# Patient Record
Sex: MALE | Race: WHITE | NOT HISPANIC OR LATINO | Employment: STUDENT | URBAN - METROPOLITAN AREA
[De-identification: names, ages, dates, MRNs, and addresses within clinical notes are randomized per-mention and may not be internally consistent; named-entity substitution may affect disease eponyms.]

---
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
## 2025-01-14 ENCOUNTER — OFFICE VISIT (OUTPATIENT)
Dept: DERMATOLOGY | Facility: CLINIC | Age: 20
End: 2025-01-14
Payer: COMMERCIAL

## 2025-01-14 ENCOUNTER — TELEPHONE (OUTPATIENT)
Dept: DERMATOLOGY | Facility: CLINIC | Age: 20
End: 2025-01-14

## 2025-01-14 VITALS — SYSTOLIC BLOOD PRESSURE: 135 MMHG | HEART RATE: 69 BPM | DIASTOLIC BLOOD PRESSURE: 84 MMHG

## 2025-01-14 DIAGNOSIS — D21.9: Primary | ICD-10-CM

## 2025-01-14 PROCEDURE — 11442 EXC FACE-MM B9+MARG 1.1-2 CM: CPT | Mod: 59 | Performed by: DERMATOLOGY

## 2025-01-14 PROCEDURE — 14060 TIS TRNFR E/N/E/L 10 SQ CM/<: CPT | Mod: GC | Performed by: DERMATOLOGY

## 2025-01-14 NOTE — PROGRESS NOTES
[facility] Excision with Frozen Pathology Surgery Procedure Note    Dermatology Problem List:  1. Epithelioid Fibrous histiocytoma, R nasal side wall, s/p excision with frozen specimen path and advancement flap repair 1/14/24     ________________________________________________________________________      Case #: 1  Date of Service:  Jan 14, 2025  Surgery: Excision with Frozen Pathology review and Advancement flap repair  Staff surgeon: Dimitris Gordillo DO  Fellow surgeon: Bruno Villaseñor MD      Tumor Type: Fibrous histiocytoma  Location: Right nasal sidewall  Derm-Path Accession #: [id number]     Derm Surgery Accession #: [id number]  Pre-Op Size: 0.8 cm x 0.7 cm  Final Defect Size: 1.5 cm x 1.4 cm  Number of Mohs stages: 1  Level of Defect: Muscle  Local anesthetic: 7 mL 1% lidocaine with epinephrine  Repair Type: Advancement flap  Repair Size: 4.5 x 1.2 cm  Suture Material: 5-0 Vicryl Rapide; 4-0 Monocryl    Procedure:    Stage I  We discussed the principles of treatment and most likely complications including scarring, bleeding, infection, swelling, pain, crusting, nerve damage, large wound,  incomplete excision, wound dehiscence,  nerve damage, recurrence, and a second procedure may be recommended to obtain the best cosmetic or functional result.    Informed consent was obtained and the patient underwent the procedure as follows:  The patient was placed supine on the operating table.  The cancer was identified, outlined with a marker, and verified by the patient.  The entire surgical field was prepped with chlorhexidine.  The surgical site was anesthetized using lidocaine with epinephrine.    The area of clinically apparent tumor was sharply debulked and submitted for frozen specimen bread loafing for histologic confirmation of the tumor appearance. The excision margin layer of tissue was then surgically excised using a #15 blade and was then transferred onto a specimen sheet maintaining the  orientation of the specimen. Hemostasis was obtained using electrocoagulation. The wound site was then covered with a dressing while the tissue samples were processed for examination.    The excised tissue was transported to the histology laboratory maintaining the tissue orientation.  The tissue specimen was relaxed so that the entire surgical margin was in a single horizontal plane for sectioning and inked for precise mapping.  A precise reference map was drawn to reflect the sectioning of the specimen, colored inking of the margins, and orientation on the patient.  The tissue was processed using horizontal sectioning of the base and continuous peripheral margins.  The histopathologic sections were reviewed in conjunction with the reference map.    Total blocks: 2  Total slides: 4    There were no cancer cells visualized at the excision margin on examination, therefore excision surgery was complete.    Reconstruction: Advancement Flap    PROCEDURE:  The wound was debeveled and undermined broadly in all directions to the level of fat. Hemostasis was obtained using electrocoagulation. The advancement flap was incised to the level of fat removing a cone of redundant tissue from nasal bridge. The flap was further undermined in all directions.    Hemostasis was again obtained as above.  The flap was then advanced into (carried over) the defect and secured using buried dermal sutures.  Redundant areas of tissue were excised using the triangulation technique.  The flap wound edges of both the primary and secondary defects were then approximated with buried dermal sutures, and the epidermis was then carefully approximated using 4-0 Monocryl sutures.  The wound was cleansed with sterile saline, and ointment was applied along the wound surface.  A sterile pressure dressing of non-adherent gauze was applied and wound care instructions were given verbally and in writing. The patient left the operating suite in stable  condition. Derma-Abrasion can be used as a second stage of this reconstruction to enhance cosmesis.    Dimitris Gordillo was always immediately available.     Dr Bruno Villaseñor (Mohs micrographic surgery fellow) performed the micrographic surgery; and Dimitris Gordillo DO performed the reconstruction/repair and was present for the entire micrographic surgery and always immediately available.     Staff Physician Comments:   I saw and evaluated the patient with the Mohs Surgery Fellow (Dr. Bruno Villaseñor) and I agree with the assessment and plan and the above description of the procedure. I personally performed the entire procedure and entire exam.    Dimitris Gordillo DO    Department of Dermatology  [facility]: Phone: [phone], Fax:[phone]  [facility]: Phone: [phone], Fax: [phone]    Care and Laboratory Testing Performed at:  [facility]   Clinics and Surgery Center - Dermatologic Surgery Clinic  [address] [place] [address]   Mail Code [id number] [address]

## 2025-01-14 NOTE — LETTER
1/14/2025       RE: Evelio Arciniega  [address]  [place] [address]     Dear Colleague,    Thank you for referring your patient, Evelio Arciniega, to the [facility]MATOLOGIC SURGERY [facility] [place] [facility]. Please see a copy of my visit note below.    [facility] Excision with Frozen Pathology Surgery Procedure Note    Dermatology Problem List:  1. Epithelioid Fibrous histiocytoma, R nasal side wall, s/p excision with frozen specimen path and advancement flap repair 1/14/24     ________________________________________________________________________      Case #: 1  Date of Service:  Jan 14, 2025  Surgery: Excision with Frozen Pathology review and Advancement flap repair  Staff surgeon: Dimitris Gordillo DO  Fellow surgeon: Bruno Villaseñor MD      Tumor Type: Fibrous histiocytoma  Location: Right nasal sidewall  Derm-Path Accession #: [id number]     Derm Surgery Accession #: [id number]  Pre-Op Size: 0.8 cm x 0.7 cm  Final Defect Size: 1.5 cm x 1.4 cm  Number of Mohs stages: 1  Level of Defect: Muscle  Local anesthetic: 7 mL 1% lidocaine with epinephrine  Repair Type: Advancement flap  Repair Size: 4.5 x 1.2 cm  Suture Material: 5-0 Vicryl Rapide; 4-0 Monocryl    Procedure:    Stage I  We discussed the principles of treatment and most likely complications including scarring, bleeding, infection, swelling, pain, crusting, nerve damage, large wound,  incomplete excision, wound dehiscence,  nerve damage, recurrence, and a second procedure may be recommended to obtain the best cosmetic or functional result.    Informed consent was obtained and the patient underwent the procedure as follows:  The patient was placed supine on the operating table.  The cancer was identified, outlined with a marker, and verified by the patient.  The entire surgical field was prepped with chlorhexidine.  The surgical site was anesthetized  using lidocaine with epinephrine.    The area of clinically apparent tumor was sharply debulked and submitted for frozen specimen bread loafing for histologic confirmation of the tumor appearance. The excision margin layer of tissue was then surgically excised using a #15 blade and was then transferred onto a specimen sheet maintaining the orientation of the specimen. Hemostasis was obtained using electrocoagulation. The wound site was then covered with a dressing while the tissue samples were processed for examination.    The excised tissue was transported to the histology laboratory maintaining the tissue orientation.  The tissue specimen was relaxed so that the entire surgical margin was in a single horizontal plane for sectioning and inked for precise mapping.  A precise reference map was drawn to reflect the sectioning of the specimen, colored inking of the margins, and orientation on the patient.  The tissue was processed using horizontal sectioning of the base and continuous peripheral margins.  The histopathologic sections were reviewed in conjunction with the reference map.    Total blocks: 2  Total slides: 4    There were no cancer cells visualized at the excision margin on examination, therefore excision surgery was complete.    Reconstruction: Advancement Flap    PROCEDURE:  The wound was debeveled and undermined broadly in all directions to the level of fat. Hemostasis was obtained using electrocoagulation. The advancement flap was incised to the level of fat removing a cone of redundant tissue from nasal bridge. The flap was further undermined in all directions.    Hemostasis was again obtained as above.  The flap was then advanced into (carried over) the defect and secured using buried dermal sutures.  Redundant areas of tissue were excised using the triangulation technique.  The flap wound edges of both the primary and secondary defects were then approximated with buried dermal sutures, and the  epidermis was then carefully approximated using 4-0 Monocryl sutures.  The wound was cleansed with sterile saline, and ointment was applied along the wound surface.  A sterile pressure dressing of non-adherent gauze was applied and wound care instructions were given verbally and in writing. The patient left the operating suite in stable condition. Derma-Abrasion can be used as a second stage of this reconstruction to enhance cosmesis.    Dimitris Gordillo was always immediately available.     Dr Bruno Villaseñor (Mohs micrographic surgery fellow) performed the micrographic surgery; and Dimitris Gordillo DO performed the reconstruction/repair and was present for the entire micrographic surgery and always immediately available.     Staff Physician Comments:   I saw and evaluated the patient with the Mohs Surgery Fellow (Dr. Bruno Villaseñor) and I agree with the assessment and plan and the above description of the procedure. I personally performed the entire procedure and entire exam.    Dimitris Gordillo DO    Department of Dermatology  [facility]: Phone: [phone], Fax:[phone]  [facility]: Phone: [phone], Fax: [phone]    Care and Laboratory Testing Performed at:  [facility]   Clinics and Surgery Center - Dermatologic Surgery Clinic  [address] [place] [address]   Mail Code [id number]   [place] [address]      Again, thank you for allowing me to participate in the care of your patient.      Sincerely,    Dimitris Gordillo MD

## 2025-01-14 NOTE — NURSING NOTE
Chief Complaint   Patient presents with    Derm Problem     R nasal sidewall mohs Lindsay, RN-BSN  Dermatology Surgery

## 2025-01-14 NOTE — PATIENT INSTRUCTIONS
Wound care    I will experience scar, bleeding, swelling, pain, crusting and redness. I may experience incomplete removal or recurrence. Risks are bleeding, bruising, swelling, infection, nerve damage, & a large wound. A second procedure may be recommended to obtain the best cosmetic or functional result.       A three month office visit with your Surgeon is recommended for scar evaluation. Please reach out sooner if you have concerns about you surgical site/wound.    Caring for your skin after surgery    After your surgery, a pressure bandage will be placed over the area that has stitches. This is important to prevent bleeding. Please follow these instructions over the next 1 to 2 weeks. Following this regimen will help to prevent complications as your wound heals.     For the first 48 hours after your surgery:    Leave the pressure dressing on and keep it dry. If it should come loose, you may re-tape it, but do not take it off.  Relax and take it easy. Do not do any vigorous exercise or heavy lifting. This could cause the wound to bleed.  Post-Operative pain is usually mild. If you are able to take tylenol, You may take plain or extra-strength Tylenol (acetaminophen) As directed on the bottle (do not take more than 4,000mg in one day). If you are able to take ibuprofen, you can alternate the tylenol and ibuprofen.   Avoid alcohol as this may increase your tendency to bleed.   You may put an ice pack around the bandaged area for 20 minutes at a time as needed. This may help reduce swelling, bruising, and pain. Make sure the ice pack is waterproof so that the pressure bandage doesn t get wet.  If the wound is on the face try to sleep with your head elevated. Either in a recliner or propped up in bed, this will decrease swelling around the eyes.   You may see a small amount of drainage or blood on your pressure bandage. This is normal. However:  If drainage or bleeding continues or saturates the bandage, you will  need to apply firm pressure over the bandage with a piece of gauze for 15 minutes.  If bleeding continues after applying pressure for 15 minutes, apply an ice pack to the bandaged area for 15 minutes.  If bleeding still continues, call our office or go to the nearest emergency room.    Remove pressure dressing 48 hours after surgery:    Carefully remove the pressure bandage. If it seems sticky or too difficult to get off, you may need to soak it off in the shower.  After the pressure dressing is removed, you may shower and get the wound wet. However, Do Not let the forceful stream of the shower hit the wound directly.  Follow these wound care and dressing change instructions:    You have skin glue over the stitches. This will dry and flake off with time (about 2 weeks). If the stitches are still hanging around after 2 weeks, let us know, we can clip them out for you if they do not fall out with washing.   You may allow water to run over the site. Do not soak.  Do Not rub or scrub the site.  Pat dry after the shower or bath.  Avoid topical medications, lotions, creams, ointment,or oils.  Do not use tanning lamps or expose the site to sun.   Check wound appearance daily, some swelling and redness is normal after a procedure but should go away as your would is healing. If the swelling and redness or pain increases or if any other signs of infection occur listed below please send in a photo via my chart and or call us to let us know.  The clear glue film should start peeling and flaking off approximately around 2 weeks. By this time your wound should be sufficiently healed. If it still looks to be healing when the glue comes off you can clean the wound with soapy warm water daily in the shower. No need to bandage further, but you can put a bandage on the area daily for the two weeks if you would like.   If skin glue and sutures are still intact at 2 weeks after your procedure, you can start applying Vaseline daily to  "help soften up the skin glue. It will come off easier this way.        If you are able to take acetaminophen (\"Tylenol,\" etc.) and ibuprofen (\"Advil\" or \"Motrin,\" etc.), then you may STAGGER these medications by taking 400 mg of ibuprofen (usually two tabs) every 8 hours and 1,000 mg of acetaminophen (e.g., two tabs of extra-strength Tylenol) every 8 hours.    This means, for example, that you could take the followin,000 mg of Tylenol, followed 4 hours later by 400 mg Ibuprofen, followed 4 hours later with 1,000 mg of Tylenol, followed 4 hours later by 400 mg Ibuprofen, followed 4 hours later with 1,000 mg of Tylenol, and so forth.     Essentially, you can take either 1,000 mg of Tylenol or 400 mg of ibuprofen in alternating fashion EVERY FOUR HOURS.    Do NOT exceed more than 4,000 mg of Tylenol or 3,200 mg of ibuprofen per 24 hours. If you are not able to take Tylenol or ibuprofen as above due to other health issues (or a physician has told you directly that you are not allowed to take one of them, say due to pre-existing severe liver or kidney issues), then disregard the above directions.    Scientific evidence supports that this combination/schedule of pain medications is just as effective, if not more effective, than taking a narcotic pain medicine.       Follow up will be a 3 month scar evaluation either in person or via a telephone visit with you sending in a photo via [employer]. Unless you have been told to follow up sooner or if you have concerns and would like to be see sooner. Please call or send us in a [employer] message if possible and attach a photo.        What to expect:    The first couple of days your wound may be tender and may bleed slightly when doing wound care.  There may be swelling and bruising around the wound, especially if it is near the eyes. For your comfort, you may apply ice or cold compresses to the bruises after your have removed the pressure bandage.  The area around your wound may " be numb for several weeks or even months.  You may experience periodic sharp pain or mild itching around the wound as it heals.   The suture line will look dark for a while but will lighten over time.       When to call us:    You have bleeding that will not stop after applying pressure and ice.  You have pain that is not controlled with Tylenol (acetaminophen.)  You have signs or symptoms of an infection such as:  Fever over 100 degrees Fahrenheit  Redness, warmth or foul-smelling drainage from the wound  If you have any questions, or are not sure how to take care of the wound.    Phone numbers:    During business hours (M-F 8:00-4:30 p.m.)  [facility]-  [phone] Option 1 appt. Desk and ask for the Dermatology Surgery Team  [phone] [facility] .     ---------------------------------------------------------  Evenings/Weekends/Holidays  Hospital - [phone]   TTY for hearing aipvdzhv-[phone]  *Ask  to page dermatologist on-call  Emergency Xvtt-[phone]  TTY for hearing impaired- [phone]

## 2025-01-14 NOTE — TELEPHONE ENCOUNTER
Follow up call attempted & left voicemail following Mohs procedure with Dr. Gordillo.       Are you having pain?   Are you taking pain medication?   Are you applying ice?    Have you had any noticeable bleeding through the bandage?    Do you have any other concerns?        Please call [phone] if you have any questions or concerns during business hours. For concerns after hours, call the hospital  to reach the dermatology resident on call at [phone], option 4.     Rosi QUEEN RN  Dermatology Surgery

## 2025-01-28 ENCOUNTER — MYC MEDICAL ADVICE (OUTPATIENT)
Dept: DERMATOLOGY | Facility: CLINIC | Age: 20
End: 2025-01-28

## 2025-01-28 ENCOUNTER — OFFICE VISIT (OUTPATIENT)
Dept: DERMATOLOGY | Facility: CLINIC | Age: 20
End: 2025-01-28
Payer: COMMERCIAL

## 2025-01-28 DIAGNOSIS — D23.9 FIBROUS HISTIOCYTOMA OF SKIN: Primary | ICD-10-CM

## 2025-01-28 NOTE — LETTER
1/28/2025       RE: Evelio Arciniega  [address]  [place] [address]     Dear Colleague,    Thank you for referring your patient, Evelio Arciniega, to the [facility] [place] at [facility]. Please see a copy of my visit note below.    Dermatologic Surgery Post-Op Wound Check     Dermatology Problem List:  1. 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 He denies pain at the site. No problems breathing through right side.   - no other concerns today    Objective: An exam of the nose was performed today   - The surgical site noted above is clean, dry, and intact. There is no surrounding erythema, purulence, or significant tenderness to palpation. No clinical evidence of infection noted today.    Assessment and Plan:     1. Epithelioid Fibrous Histiocystoma, R nasal sidewall.  - The patient's surgery site is healing very well. No evidence of infection on examination today.  - The patient was told to continue with wound cares until the area(s) is/are no longer crusted.   - The patient should follow up with dermatologic surgery in 3 months for scar evaluation.     Dimitris Gordillo DO    Department of Dermatology  [facility]: Phone: [phone], Fax:[phone]  [facility]: Phone: [phone], Fax:  [phone]               Again, thank you for allowing me to participate in the care of your patient.      Sincerely,    Dimitris Gordillo MD

## 2025-01-28 NOTE — PROGRESS NOTES
Dermatologic Surgery Post-Op Wound Check     Dermatology Problem List:  1. 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 He denies pain at the site. No problems breathing through right side.   - no other concerns today    Objective: An exam of the nose was performed today   - The surgical site noted above is clean, dry, and intact. There is no surrounding erythema, purulence, or significant tenderness to palpation. No clinical evidence of infection noted today.    Assessment and Plan:     1. Epithelioid Fibrous Histiocystoma, R nasal sidewall.  - The patient's surgery site is healing very well. No evidence of infection on examination today.  - The patient was told to continue with wound cares until the area(s) is/are no longer crusted.   - The patient should follow up with dermatologic surgery in 3 months for scar evaluation.     Dimitris Gordillo DO    Department of Dermatology  [facility]: Phone: [phone], Fax:[phone]  [facility]: Phone: [phone], Fax: [phone]

## 2025-01-28 NOTE — NURSING NOTE
Chief Complaint   Patient presents with    RECHECK     Wound check nose     VERONICA Subramanian-BSN  Dermatology Surgery

## 2025-01-28 NOTE — PROGRESS NOTES
[facility] Dermatology Note  Encounter Date: Jan 28, 2025  Office Visit     Dermatology Problem List:    1. 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   {kkplans:854562}   - ***     # ***.   {kkplans:284725}   - ***     Procedures Performed:   {bsproceduresnotes:463793}  {bsproceduresnotes:932769}    Follow-up: {kkfollowup:537275}    {kkstaffinvolved:734976}    ***  ____________________________________________    CC: No chief complaint on file.    HPI:  Mr. Evelio Arciniega is a(n) 19 year old male who presents today {kknew/return:466539} for ***    Patient is otherwise feeling well, without additional skin concerns.     Labs Reviewed:  ***N/A    Physical Exam:  Vitals: There were no vitals taken for this visit.  SKIN: {kkSkinExam:658439}  - ***  - {Skin Exam Derm:991456}.   - {Skin Exam Derm:686001}.   - {Skin Exam Derm:782342}.   - No other lesions of concern on areas examined.     Medications:  Current Outpatient Medications   Medication Sig Dispense Refill    albuterol (PROAIR HFA/PROVENTIL HFA/VENTOLIN HFA) 108 (90 BASE) MCG/ACT Inhaler Inhale 2 puffs into the lungs every 4 hours as needed for shortness of breath / dyspnea or wheezing (Patient not taking: Reported on 9/3/2024)      budesonide-formoterol (SYMBICORT) 80-4.5 MCG/ACT Inhaler Inhale 2 puffs into the lungs 2 times daily (Patient not taking: Reported on 9/3/2024)      DiphenhydrAMINE HCl (BENADRYL PO) Take by mouth every 4 hours as needed      EPINEPHrine 0.3 MG/0.3ML injection Inject 0.3 mg into the muscle as needed for anaphylaxis      fluticasone-salmeterol (ADVAIR-HFA) 115-21 MCG/ACT Inhaler Inhale 2 puffs into the lungs 2 times daily (Patient not taking: Reported on 9/3/2024)       No current facility-administered medications for this visit.      Past Medical  History:   There is no problem list on file for this patient.    Past Medical History:   Diagnosis Date    Uncomplicated asthma        CC No referring provider defined for this encounter. on close of this encounter.

## 2025-05-13 ENCOUNTER — OFFICE VISIT (OUTPATIENT)
Dept: DERMATOLOGY | Facility: CLINIC | Age: 20
End: 2025-05-13
Payer: COMMERCIAL

## 2025-05-13 DIAGNOSIS — D23.9 FIBROUS HISTIOCYTOMA OF SKIN: Primary | ICD-10-CM

## 2025-05-13 DIAGNOSIS — L90.5 FACIAL SCAR: ICD-10-CM

## 2025-05-13 ASSESSMENT — PAIN SCALES - GENERAL: PAINLEVEL_OUTOF10: NO PAIN (0)

## 2025-05-13 NOTE — NURSING NOTE
Chief Complaint   Patient presents with    Scar Management     Patient is here today for a scar eval on nose.      Jacqueline BARRY CMA

## 2025-05-13 NOTE — LETTER
5/13/2025       RE: Evelio Arciniega  [address]  [place] [address]     Dear Colleague,    Thank you for referring your patient, Evelio Arciniega, to the [facility] DERMATOLOGIC SURGERY CLINIC [place] at [facility]. Please see a copy of my visit note below.    Dermatologic Surgery Post-Op Scar Check     CC: Scar Management (Patient is here today for a scar eval on nose. )    Dermatology Problem List:  1. 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   - Patient says his friends say they cannot see it  - no other concerns today    Objective: An exam of the R nasal sidewall was performed today   - well-healed scar on the right nasal sidewall     Assessment and Plan:     1. Post-surgical scar, Epithelioid Fibrous histiocytoma, R nasal sidewall, s/p excision  1/14/24  - Surgical site healed well   - The patient should follow up with dermatologic surgery PRN, as well as continue with regular skin exams in general dermatology clinic.    Patient was discussed with and evaluated by attending physician Dr. Dimitris Gordillo.    Scribe Disclosure:   I, Anni Wong, am serving as a scribe; to document services personally performed by Dimitris Gordillo MD -based on data collection and the provider's statements to me.     Provider Disclosure:  I agree with above History, Review of Systems, Physical exam and Plan.  I have reviewed the content of the documentation and have edited it as needed. I have personally performed the services documented here and the documentation accurately represents those services and the decisions I have made.      Electronically  signed by:  Bruno Villaseñor MD    Staff Physician Comments:   I saw and evaluated the patient with the Mohs Surgery Fellow (Dr. Bruno Villaseñor) and I agree with the assessment and plan and the above description of the procedure as documented by the scribe. I was present for the entire exam.    Dimitris Gordillo DO    Department of Dermatology  [facility]: Phone: [phone], Fax:[phone]  [facility] [place]: Phone: [phone], Fax: [phone]    Again, thank you for allowing me to participate in the care of your patient.      Sincerely,    Dimitris Gordillo MD

## 2025-05-13 NOTE — PROGRESS NOTES
Dermatologic Surgery Post-Op Scar Check     CC: Scar Management (Patient is here today for a scar eval on nose. )    Dermatology Problem List:  1. 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   - Patient says his friends say they cannot see it  - no other concerns today    Objective: An exam of the R nasal sidewall was performed today   - well-healed scar on the right nasal sidewall     Assessment and Plan:     1. Post-surgical scar, Epithelioid Fibrous histiocytoma, R nasal sidewall, s/p excision  1/14/24  - Surgical site healed well   - The patient should follow up with dermatologic surgery PRN, as well as continue with regular skin exams in general dermatology clinic.    Patient was discussed with and evaluated by attending physician Dr. Dimitris Gordillo.    Scribe Disclosure:   I, Anni Wong, am serving as a scribe; to document services personally performed by Dimitris Gordillo MD -based on data collection and the provider's statements to me.     Provider Disclosure:  I agree with above History, Review of Systems, Physical exam and Plan.  I have reviewed the content of the documentation and have edited it as needed. I have personally performed the services documented here and the documentation accurately represents those services and the decisions I have made.      Electronically signed by:  Bruno Villaseñor MD    Staff Physician Comments:   I saw and evaluated the patient with the Mohs Surgery Fellow (Dr. Bruno Villaseñor) and I agree with the assessment and plan and the above description of the procedure as documented by the scribe. I was present for the entire exam.    Dimitris Gordillo DO  Assistant  Professor  Department of Dermatology  [facility]: Phone: [phone], Fax:[phone]  [facility]: Phone: [phone], Fax: [phone]